# Patient Record
Sex: MALE | Race: WHITE | Employment: FULL TIME | URBAN - METROPOLITAN AREA
[De-identification: names, ages, dates, MRNs, and addresses within clinical notes are randomized per-mention and may not be internally consistent; named-entity substitution may affect disease eponyms.]

---

## 2023-12-02 ENCOUNTER — HOSPITAL ENCOUNTER (EMERGENCY)
Age: 43
Discharge: HOME | End: 2023-12-02
Payer: OTHER GOVERNMENT

## 2023-12-02 VITALS
RESPIRATION RATE: 17 BRPM | HEART RATE: 89 BPM | HEIGHT: 73 IN | TEMPERATURE: 98.7 F | DIASTOLIC BLOOD PRESSURE: 89 MMHG | SYSTOLIC BLOOD PRESSURE: 125 MMHG | BODY MASS INDEX: 28.49 KG/M2 | WEIGHT: 215 LBS | OXYGEN SATURATION: 99 %

## 2023-12-02 DIAGNOSIS — U07.1 COVID: Primary | ICD-10-CM

## 2023-12-02 LAB
FLUAV RNA SPEC QL NAA+PROBE: NOT DETECTED
FLUBV RNA SPEC QL NAA+PROBE: NOT DETECTED
SARS-COV-2 RDRP RESP QL NAA+PROBE: DETECTED
SOURCE: ABNORMAL

## 2023-12-02 PROCEDURE — 87635 SARS-COV-2 COVID-19 AMP PRB: CPT

## 2023-12-02 PROCEDURE — 99283 EMERGENCY DEPT VISIT LOW MDM: CPT

## 2023-12-02 PROCEDURE — 87502 INFLUENZA DNA AMP PROBE: CPT

## 2023-12-02 ASSESSMENT — PAIN - FUNCTIONAL ASSESSMENT: PAIN_FUNCTIONAL_ASSESSMENT: 0-10

## 2023-12-02 ASSESSMENT — PAIN SCALES - GENERAL: PAINLEVEL_OUTOF10: 2

## 2023-12-02 NOTE — ED TRIAGE NOTES
Pt came into ED c/o fever, body shakes a few days ago, fatigue, today has rash to arm pits and lower abd.

## 2023-12-02 NOTE — ED PROVIDER NOTES
Emergency Department Provider Note                   PCP:                No, Pcp               Age: 37 y.o. Sex: male     DISPOSITION Decision To Discharge 12/02/2023 01:46:16 PM       ICD-10-CM    1. COVID  U07.1           MEDICAL DECISION MAKING  Complexity of Problems Addressed:  Complexity of Problem: 1 acute, uncomplicated illness or injury. Data Reviewed and Analyzed:  Category 1:     I ordered each unique test.  I reviewed the results of each unique test.      Category 3: Discussion of management or test interpretation. 51-year-old male presents complaining of a rash after he had symptoms consistent with a viral syndrome over the past few days. On presentation, patient is afebrile, vital signs are stable, and he is very well-appearing in no acute distress. Currently asymptomatic. His lungs are clear to auscultation in all fields without crackles, wheezes, or other adventitious sounds. No evidence of nuchal rigidity or meningeal signs. No tonsillar edema, exudate, uvular deviation, or peritonsillar abscess. He has a erythematous maculopapular blanching rash present to bilateral axilla upper arms/proximal thighs consistent with a viral exanthem. It is nontender to palpation and there is no induration, palpable fluctuance, abscess, or evidence of infection. No blistering or sloughing skin. Nikolsky sign is negative and there is no mucosal lesions. Not pruritic in nature. Rapid flu is negative. COVID is positive. Due to stable vital signs and nontoxic appearance, plan for this patient is continued outpatient management for COVID and viral exanthem. Patient will continue symptomatic treatment at home. Continue to monitor his rash and take over-the-counter ibuprofen and Tylenol as needed. He will follow-up with primary care next 2 days to ensure improvement in his symptoms.   He was given strict return precautions such as return of high fevers, changing characteristics of the rash,

## 2023-12-02 NOTE — DISCHARGE INSTRUCTIONS
Your COVID test was positive. This is likely the cause of your rash. Continue to monitor your symptoms at home. Please continue taking ibuprofen and Tylenol as needed. If you do begin to experience any new or worsening symptoms return to the ED.